# Patient Record
Sex: MALE | Race: WHITE | HISPANIC OR LATINO | URBAN - METROPOLITAN AREA
[De-identification: names, ages, dates, MRNs, and addresses within clinical notes are randomized per-mention and may not be internally consistent; named-entity substitution may affect disease eponyms.]

---

## 2020-03-14 ENCOUNTER — HOSPITAL ENCOUNTER (EMERGENCY)
Facility: HOSPITAL | Age: 30
Discharge: HOME | End: 2020-03-14
Attending: EMERGENCY MEDICINE
Payer: MEDICAID

## 2020-03-14 VITALS
BODY MASS INDEX: 25.9 KG/M2 | DIASTOLIC BLOOD PRESSURE: 79 MMHG | RESPIRATION RATE: 18 BRPM | HEART RATE: 99 BPM | HEIGHT: 67 IN | WEIGHT: 165 LBS | SYSTOLIC BLOOD PRESSURE: 141 MMHG | TEMPERATURE: 98 F | OXYGEN SATURATION: 99 %

## 2020-03-14 DIAGNOSIS — S01.01XA SCALP LACERATION, INITIAL ENCOUNTER: ICD-10-CM

## 2020-03-14 DIAGNOSIS — T07.XXXA ABRASIONS OF MULTIPLE SITES: ICD-10-CM

## 2020-03-14 DIAGNOSIS — S09.90XA INJURY OF HEAD, INITIAL ENCOUNTER: Primary | ICD-10-CM

## 2020-03-14 PROCEDURE — 25000003 PHARM REV CODE 250: Performed by: EMERGENCY MEDICINE

## 2020-03-14 PROCEDURE — 99284 PR EMERGENCY DEPT VISIT,LEVEL IV: ICD-10-PCS | Mod: 25,,, | Performed by: EMERGENCY MEDICINE

## 2020-03-14 PROCEDURE — 99284 EMERGENCY DEPT VISIT MOD MDM: CPT | Mod: 25

## 2020-03-14 PROCEDURE — 12002 RPR S/N/AX/GEN/TRNK2.6-7.5CM: CPT

## 2020-03-14 PROCEDURE — 12002 PR RESUP NPTERF WND BODY 2.6-7.5 CM: ICD-10-PCS | Mod: ,,, | Performed by: EMERGENCY MEDICINE

## 2020-03-14 PROCEDURE — 99284 EMERGENCY DEPT VISIT MOD MDM: CPT | Mod: 25,,, | Performed by: EMERGENCY MEDICINE

## 2020-03-14 PROCEDURE — 12002 RPR S/N/AX/GEN/TRNK2.6-7.5CM: CPT | Mod: ,,, | Performed by: EMERGENCY MEDICINE

## 2020-03-14 RX ORDER — LIDOCAINE HYDROCHLORIDE AND EPINEPHRINE 10; 10 MG/ML; UG/ML
10 INJECTION, SOLUTION INFILTRATION; PERINEURAL
Status: DISCONTINUED | OUTPATIENT
Start: 2020-03-14 | End: 2020-03-14 | Stop reason: HOSPADM

## 2020-03-14 RX ORDER — ONDANSETRON 4 MG/1
4 TABLET, ORALLY DISINTEGRATING ORAL EVERY 8 HOURS PRN
Qty: 12 TABLET | Refills: 1 | Status: SHIPPED | OUTPATIENT
Start: 2020-03-14

## 2020-03-14 RX ORDER — ONDANSETRON 4 MG/1
4 TABLET, ORALLY DISINTEGRATING ORAL
Status: COMPLETED | OUTPATIENT
Start: 2020-03-14 | End: 2020-03-14

## 2020-03-14 RX ADMIN — ONDANSETRON 4 MG: 4 TABLET, ORALLY DISINTEGRATING ORAL at 08:03

## 2020-03-14 NOTE — ED TRIAGE NOTES
To the ED following a skate board fall.  Pt believes he fell onto his right forearm and than struck his head.  No LOC, reporting a headache  Pain 5/10.

## 2020-03-14 NOTE — ED NOTES
LOC: The patient is awake, alert, and oriented to place, time, situation. Affect is appropriate.  Speech is appropriate and clear.     APPEARANCE: Patient resting uncomfortably, reporting headache.  Patient is clean and well groomed.    SKIN: The skin is warm and dry; color consistent with ethnicity.  Patient has normal skin turgor and moist mucus membranes.  LAceration to back of head, abrasion to left cheek, under chin, left eyebrow. Right  forearm      MUSCULOSKELETAL: Patient moving upper and lower extremities without difficulty.  Denies weakness.     RESPIRATORY: Airway is open and patent. Respirations spontaneous, even, easy, and non-labored.  Patient has a normal effort and rate.  No accessory muscle use noted. Denies cough.     CARDIAC:   No peripheral edema noted. No complaints of chest pain.      ABDOMEN: Soft and non tender to palpation.  No distention noted.     NEUROLOGIC: Eyes open spontaneously.  Reporting headache.  Behavior appropriate to situation.  Follows commands; facial expression symmetrical.  Purposeful motor response noted; normal sensation in all extremities.

## 2020-03-15 NOTE — ED NOTES
Called CT scan. CT scan technician states there is a patient on the table at this time but they will come get patient right after.

## 2020-03-15 NOTE — ED PROVIDER NOTES
Encounter Date: 3/14/2020    SCRIBE #1 NOTE: I, Francie Armstrong, am scribing for, and in the presence of,  Dr. Pino. I have scribed the entire note.       History     Chief Complaint   Patient presents with    Fall     was on skate board,hit head bleeding controlled, no loc , numerous abrasions     Time patient was seen by the provider: 7:14 PM      The patient is a 29 y.o. male with no known co-morbidities, who presents to the ED with a complaint of occipital lacerations, with associated facial and right arm abrasions, after falling off of a skateboard 45 minutes ago. His memory of the fall is not clear. No LOC. He was not wearing a helmet. He walked around normally afterwards. No vomiting. Last tetanus was last year.     The history is provided by the patient.     Review of patient's allergies indicates:  No Known Allergies  History reviewed. No pertinent past medical history.  History reviewed. No pertinent surgical history.  No family history on file.  Social History     Tobacco Use    Smoking status: Never Smoker    Smokeless tobacco: Never Used   Substance Use Topics    Alcohol use: Yes    Drug use: Never     Review of Systems   HENT:        Positive for occipital lacerations.    Gastrointestinal: Negative for nausea and vomiting.   Skin:        Positive for lacerations over occiput. Positive for abrasions on right arm and left side of face.    Neurological:        Negative for loss of consciousness.    All other systems reviewed and are negative.      Physical Exam     Initial Vitals [03/14/20 1845]   BP Pulse Resp Temp SpO2   (!) 142/78 99 18 98.1 °F (36.7 °C) 99 %      MAP       --         Physical Exam    Nursing note and vitals reviewed.  Constitutional: He appears well-developed and well-nourished. He is not diaphoretic. No distress.   HENT:   Head: Normocephalic.   Right occiput 1 cm laceration and 3 cm laceration. Abrasion to left eyebrow, left cheek, left nose.    Eyes: Pupils are equal, round,  and reactive to light.   Neck: Normal range of motion. Neck supple.   No bony tenderness or deformities.    Cardiovascular: Normal rate, regular rhythm and normal heart sounds. Exam reveals no gallop and no friction rub.    No murmur heard.  Pulmonary/Chest: Breath sounds normal. No respiratory distress. He has no wheezes. He has no rhonchi. He has no rales.   Abdominal: Soft. He exhibits no distension. There is no tenderness. There is no rebound and no guarding.   Musculoskeletal: Normal range of motion.   No bony tenderness or deformities.    Neurological: He is alert and oriented to person, place, and time. He has normal strength. No cranial nerve deficit or sensory deficit. GCS score is 15. GCS eye subscore is 4. GCS verbal subscore is 5. GCS motor subscore is 6.   Skin: Skin is warm and dry.   15-20 cm x 3 cm abrasion to the right proximal lateral forearm. Few abrasions on bilateral hands.          ED Course   Lac Repair  Date/Time: 3/14/2020 9:22 PM  Performed by: Francie Armstrong  Authorized by: Arthur Pino MD   Body area: head/neck  Location details: scalp  Laceration length: 3 cm    Anesthesia:  Local Anesthetic: lidocaine 1% with epinephrine  Skin closure: staples  Number of sutures: 3    Lac Repair  Date/Time: 3/14/2020 9:23 PM  Performed by: Francie Armstrong  Authorized by: Arthur Pino MD   Laceration length: 1 cm    Anesthesia:  Local Anesthetic: lidocaine 1% with epinephrine  Skin closure: staples  Number of sutures: 1        Labs Reviewed - No data to display       Imaging Results          CT Head Without Contrast (Final result)  Result time 03/14/20 21:16:40    Final result by Amadeo Nascimento MD (03/14/20 21:16:40)                 Impression:      Right posterior scalp hematoma/soft tissue swelling, as above.  No underlying displaced calvarial fracture.  No intracranial hemorrhage.  (Of note, evaluation of vascular structures is limited by technique and lack of intravenous  contrast.)    Electronically signed by resident: Korey Saavedra MD  Date:    03/14/2020  Time:    20:47    Electronically signed by: Amadeo Nascimento MD  Date:    03/14/2020  Time:    21:16             Narrative:    EXAMINATION:  CT HEAD WITHOUT CONTRAST    CLINICAL HISTORY:  Head trauma, intracranial arterial inj suspected;    TECHNIQUE:  Low dose axial images were obtained through the head.  Coronal and sagittal reformations were also performed. Contrast was not administered.    COMPARISON:  No priors.    FINDINGS:  The ventricles are normal in size for age and midline.  No evidence of hydrocephalus.  The brain parenchyma is normal in attenuation.  Focal region of hypoattenuation along the anterior right sylvian fissure, presumably a prominent subarachnoid space.  No evidence of acute or remote major vascular distribution infarct, intracranial hemorrhage, edema, or mass effect.  No extra-axial blood or fluid collections.    There is mixed attenuation right parietal scalp hematoma with punctate internal air and surrounding soft tissue swelling extending inferiorly along the right lateral aspect of the posterior neck.  No convincing evidence of underlying calvarial fracture.  There is patchy mucosal thickening of the ethmoid and maxillary sinuses.  The mastoid air cells are essentially clear.                              X-Rays:   Independently Interpreted Readings:   Head CT: No intracranial bleed.      Medical Decision Making:   History:   Old Medical Records: I decided to obtain old medical records.  Initial Assessment:   Patient has a head injury, facial injury, and abrasions after a skateboard injury. He has a normal neuro exam and GCS 15. Nexus C spine rule negative and he does not require imaging. [place] head CT could be positive for mechanism only. I discussed this with the patient and he declined imaging including head and facial CT. I have a low suspicion of intracranial bleed, skull fracture, or facial  fracture.   Independently Interpreted Test(s):   I have ordered and independently interpreted X-rays - see prior notes.  Clinical Tests:   Radiological Study: Ordered and Reviewed            Scribe Attestation:   Scribe #1: I performed the above scribed service and the documentation accurately describes the services I performed. I attest to the accuracy of the note.            ED Course as of Mar 14 2336   Sat Mar 14, 2020   2006 The patient previously declined head CT.  He is now vomiting.  He was given Zofran ODT.  I explained that vomiting could be a sign of head injury and he does require a head CT.  The patient continues declined this.  I began to discuss the AMA form and risks of declining the head CT.  The patient then asked to contact his mother to further discuss details.  His concerned about the cost of the head CT.  I explained that in the setting of head trauma with persistent nausea vomiting, head CT is indicated.     [LAURA]      ED Course User Index  [LAURA] Arthur Pino MD                Clinical Impression:       ICD-10-CM ICD-9-CM   1. Injury of head, initial encounter S09.90XA 959.01   2. Scalp laceration, initial encounter S01.01XA 873.0   3. Abrasions of multiple sites T07.XXXA 919.0   4. Concussion with loss of consciousness of 30 minutes or less, initial encounter S06.0X1A 850.11         Disposition:   Disposition: Discharged  Condition: Stable     ED Disposition Condition    Discharge Stable        ED Prescriptions     Medication Sig Dispense Start Date End Date Auth. Provider    ondansetron (ZOFRAN-ODT) 4 MG TbDL Take 1 tablet (4 mg total) by mouth every 8 (eight) hours as needed. 12 tablet 3/14/2020  Arthur Pino MD        Follow-up Information     Follow up With Specialties Details Why Contact Info Additional Information    [facility]  As needed [address] [address] [phone]     [facility] - Neurology Neurology  In 1 week  [address]  [place] [address] [phone] 7th Floor - [facility] [place]                      I, Dr. Bud Pino, personally performed the services described in this documentation. All medical record entries made by the scribe were at my direction and in my presence.  I have reviewed the chart and agree that the record reflects my personal performance and is accurate and complete. Bud Pino MD.               Arthur Pino MD  03/14/20 5320

## 2020-09-04 ENCOUNTER — OFFICE VISIT (OUTPATIENT)
Dept: URGENT CARE | Facility: CLINIC | Age: 30
End: 2020-09-04
Payer: MEDICAID

## 2020-09-04 VITALS
HEART RATE: 85 BPM | BODY MASS INDEX: 25.9 KG/M2 | WEIGHT: 165 LBS | DIASTOLIC BLOOD PRESSURE: 84 MMHG | HEIGHT: 67 IN | TEMPERATURE: 99 F | RESPIRATION RATE: 16 BRPM | OXYGEN SATURATION: 97 % | SYSTOLIC BLOOD PRESSURE: 148 MMHG

## 2020-09-04 DIAGNOSIS — Z20.822 CLOSE EXPOSURE TO COVID-19 VIRUS: Primary | ICD-10-CM

## 2020-09-04 DIAGNOSIS — R50.9 FEVER, UNSPECIFIED FEVER CAUSE: ICD-10-CM

## 2020-09-04 LAB
CTP QC/QA: YES
SARS-COV-2 RDRP RESP QL NAA+PROBE: NEGATIVE

## 2020-09-04 PROCEDURE — U0002 COVID-19 LAB TEST NON-CDC: HCPCS | Mod: QW,S$GLB,, | Performed by: NURSE PRACTITIONER

## 2020-09-04 PROCEDURE — U0002: ICD-10-PCS | Mod: QW,S$GLB,, | Performed by: NURSE PRACTITIONER

## 2020-09-04 PROCEDURE — 99203 OFFICE O/P NEW LOW 30 MIN: CPT | Mod: S$GLB,,, | Performed by: NURSE PRACTITIONER

## 2020-09-04 PROCEDURE — 99203 PR OFFICE/OUTPT VISIT, NEW, LEVL III, 30-44 MIN: ICD-10-PCS | Mod: S$GLB,,, | Performed by: NURSE PRACTITIONER

## 2020-09-04 NOTE — PATIENT INSTRUCTIONS
If your condition worsens or fails to improve we recommend that you receive another evaluation at the ER immediately or contact your PCP to discuss your concerns or return here. You must understand that you've received an urgent care treatment only and that you may be released before all your medical problems are known or treated. You the patient will arrange for followup care as instructed.    If you were prescribed antibiotics, please take them to completion.  If you were prescribed a narcotic medication, do not drive or operate heavy equipment or machinery while taking these medications.  Please follow up with your primary care doctor or specialist as needed.  If you  smoke, please stop smoking.  Guidelines for General Prevention of COVID-19    o Take steps to protect yourself from COVID-19. Perform hand hygiene frequently. Wash your hands often with soap and water for at least 20 seconds of use and alcohol-based hand , covering all surfaces of your hands and rubbing them together until they feel dry.  o Avoid touching your eyes, nose, and mouth with unwashed hands.  o Avoid close contact with people and stay home if youre sick, except to get medical care.   o Cover coughs and sneezes with a tissue, or use the inside of your elbow. Immediately wash your hands or use hand .     For more information, see CDC link below:    https://www.cdc.gov/coronavirus/2019-ncov/hcp/guidance-prevent-spread.html#precautions

## 2020-09-04 NOTE — PROGRESS NOTES
"Subjective:       Patient ID: Patrick Olsen is a 29 y.o. male.    Vitals:  height is 5' 7" (1.702 m) and weight is 74.8 kg (165 lb). His temporal temperature is 99.1 °F (37.3 °C). His blood pressure is 148/84 (abnormal) and his pulse is 85. His respiration is 16 and oxygen saturation is 97%.     Chief Complaint: COVID-19 Concerns (FEVER, FATIGUE)    This is a 29 y.o. male who presents today with a chief complaint of fatigue and fever that started 3 days ago, patient reports his brother tested positive but he has been self quarantine, denies cough but reports mild shortness of breath due to anxiety, denies headache, denies nausea,  vomiting or diarrhea or abdominal pain, patient reports he does have loss of taste or smell but that's been going on since he had the concussion in March of 2020, denies history of asthma or wheezing, denies dizziness or lightheadedness    Other  This is a new (EXPOSURE TO POS COVID-19) problem. The current episode started in the past 7 days (X3 DAYS). The problem occurs constantly. The problem has been unchanged. Associated symptoms include fatigue and a fever. Pertinent negatives include no arthralgias, chest pain, chills, congestion, coughing, headaches, joint swelling, myalgias, nausea, rash, sore throat, vertigo or vomiting. Nothing aggravates the symptoms. He has tried nothing for the symptoms.       Constitution: Positive for fatigue and fever. Negative for chills.   HENT: Negative for congestion and sore throat.    Neck: Negative for painful lymph nodes.   Cardiovascular: Negative for chest pain and leg swelling.   Eyes: Negative for double vision and blurred vision.   Respiratory: Negative for cough and shortness of breath.    Gastrointestinal: Negative for nausea, vomiting and diarrhea.   Genitourinary: Negative for dysuria, frequency and urgency.   Musculoskeletal: Negative for joint pain, joint swelling, muscle cramps and muscle ache.   Skin: Negative for color change, pale and " rash.   Allergic/Immunologic: Negative for seasonal allergies.   Neurological: Negative for dizziness, history of vertigo, light-headedness, passing out and headaches.   Hematologic/Lymphatic: Negative for swollen lymph nodes, easy bruising/bleeding and history of blood clots. Does not bruise/bleed easily.   Psychiatric/Behavioral: Negative for nervous/anxious, sleep disturbance and depression. The patient is not nervous/anxious.        Objective:      Physical Exam   Constitutional: He is oriented to person, place, and time. He appears well-developed. He is cooperative.  Non-toxic appearance. He does not appear ill. No distress.   HENT:   Head: Normocephalic and atraumatic.   Ears:   Right Ear: Hearing, tympanic membrane, external ear and ear canal normal.   Left Ear: Hearing, tympanic membrane, external ear and ear canal normal.   Nose: Nose normal. No mucosal edema, rhinorrhea or nasal deformity. No epistaxis. Right sinus exhibits no maxillary sinus tenderness and no frontal sinus tenderness. Left sinus exhibits no maxillary sinus tenderness and no frontal sinus tenderness.   Mouth/Throat: Uvula is midline, oropharynx is clear and moist and mucous membranes are normal. No trismus in the jaw. Normal dentition. No uvula swelling. No oropharyngeal exudate, posterior oropharyngeal edema or posterior oropharyngeal erythema.   Eyes: Conjunctivae and lids are normal. No scleral icterus.   Neck: Trachea normal, full passive range of motion without pain and phonation normal. Neck supple. No neck rigidity. No edema and no erythema present.   Cardiovascular: Normal rate, regular rhythm, normal heart sounds and normal pulses.   Pulmonary/Chest: Effort normal and breath sounds normal. No stridor. No respiratory distress. He has no decreased breath sounds. He has no wheezes. He has no rhonchi. He has no rales.   Abdominal: Normal appearance.   Musculoskeletal: Normal range of motion.         General: No deformity.    Neurological: He is alert and oriented to person, place, and time. He exhibits normal muscle tone. Coordination normal.   Skin: Skin is warm, dry, intact, not diaphoretic and not pale. Psychiatric: His speech is normal and behavior is normal. Judgment and thought content normal.   Nursing note and vitals reviewed.        Results for orders placed or performed in visit on 09/04/20   POCT COVID-19 Rapid Screening   Result Value Ref Range    POC Rapid COVID Negative Negative     Acceptable Yes          Detailed education provided about COVID-19 precautions and recommendations as per CDC guidelines.  Discussed results/diagnosis/plan in depth with patient in clinic. Strict precautions given to patient to monitor for worsening signs and symptoms. Advised to follow up with primary.All questions answered. Strict ER precautions given. If your symptoms worsens of fail to improve you should go to the Emergency Room. Discharge and follow-up instructions given verbally/printed. Patient voiced understanding and in agreement with current treatment plan.        Assessment:       1. Close Exposure to Covid-19 Virus    2. Fever, unspecified fever cause        Plan:         Close Exposure to Covid-19 Virus    Fever, unspecified fever cause  -     POCT COVID-19 Rapid Screening      Patient Instructions   If your condition worsens or fails to improve we recommend that you receive another evaluation at the ER immediately or contact your PCP to discuss your concerns or return here. You must understand that you've received an urgent care treatment only and that you may be released before all your medical problems are known or treated. You the patient will arrange for followup care as instructed.    If you were prescribed antibiotics, please take them to completion.  If you were prescribed a narcotic medication, do not drive or operate heavy equipment or machinery while taking these medications.  Please follow up with your  primary care doctor or specialist as needed.  If you  smoke, please stop smoking.  Guidelines for General Prevention of COVID-19    o Take steps to protect yourself from COVID-19. Perform hand hygiene frequently. Wash your hands often with soap and water for at least 20 seconds of use and alcohol-based hand , covering all surfaces of your hands and rubbing them together until they feel dry.  o Avoid touching your eyes, nose, and mouth with unwashed hands.  o Avoid close contact with people and stay home if youre sick, except to get medical care.   o Cover coughs and sneezes with a tissue, or use the inside of your elbow. Immediately wash your hands or use hand .     For more information, see CDC link below:    https://www.cdc.gov/coronavirus/2019-ncov/hcp/guidance-prevent-spread.html#precautions

## 2021-07-01 ENCOUNTER — PATIENT MESSAGE (OUTPATIENT)
Dept: ADMINISTRATIVE | Facility: OTHER | Age: 31
End: 2021-07-01

## 2021-11-30 ENCOUNTER — OFFICE VISIT (OUTPATIENT)
Dept: URGENT CARE | Facility: CLINIC | Age: 31
End: 2021-11-30
Payer: MEDICAID

## 2021-11-30 VITALS
RESPIRATION RATE: 16 BRPM | TEMPERATURE: 99 F | HEIGHT: 67 IN | WEIGHT: 165 LBS | BODY MASS INDEX: 25.9 KG/M2 | OXYGEN SATURATION: 99 % | DIASTOLIC BLOOD PRESSURE: 97 MMHG | SYSTOLIC BLOOD PRESSURE: 148 MMHG | HEART RATE: 81 BPM

## 2021-11-30 DIAGNOSIS — J06.9 VIRAL URI WITH COUGH: Primary | ICD-10-CM

## 2021-11-30 DIAGNOSIS — Z87.898 HISTORY OF HEMOPTYSIS: ICD-10-CM

## 2021-11-30 DIAGNOSIS — Z87.448 HISTORY OF BLOOD IN URINE: ICD-10-CM

## 2021-11-30 DIAGNOSIS — R05.9 COUGH: ICD-10-CM

## 2021-11-30 LAB
BILIRUB UR QL STRIP: NEGATIVE
CTP QC/QA: YES
CTP QC/QA: YES
GLUCOSE UR QL STRIP: NEGATIVE
KETONES UR QL STRIP: NEGATIVE
LEUKOCYTE ESTERASE UR QL STRIP: NEGATIVE
PH, POC UA: 6 (ref 5–8)
POC BLOOD, URINE: POSITIVE
POC MOLECULAR INFLUENZA A AGN: NEGATIVE
POC MOLECULAR INFLUENZA B AGN: NEGATIVE
POC NITRATES, URINE: NEGATIVE
PROT UR QL STRIP: NEGATIVE
SARS-COV-2 RDRP RESP QL NAA+PROBE: NEGATIVE
SP GR UR STRIP: 1.01 (ref 1–1.03)
UROBILINOGEN UR STRIP-ACNC: ABNORMAL (ref 0.3–2.2)

## 2021-11-30 PROCEDURE — 81003 URINALYSIS AUTO W/O SCOPE: CPT | Mod: QW,S$GLB,,

## 2021-11-30 PROCEDURE — 87502 POCT INFLUENZA A/B MOLECULAR: ICD-10-PCS | Mod: QW,S$GLB,,

## 2021-11-30 PROCEDURE — 87502 INFLUENZA DNA AMP PROBE: CPT | Mod: QW,S$GLB,,

## 2021-11-30 PROCEDURE — 87086 URINE CULTURE/COLONY COUNT: CPT

## 2021-11-30 PROCEDURE — U0002 COVID-19 LAB TEST NON-CDC: HCPCS | Mod: QW,S$GLB,,

## 2021-11-30 PROCEDURE — 99213 PR OFFICE/OUTPT VISIT, EST, LEVL III, 20-29 MIN: ICD-10-PCS | Mod: 25,S$GLB,,

## 2021-11-30 PROCEDURE — 99213 OFFICE O/P EST LOW 20 MIN: CPT | Mod: 25,S$GLB,,

## 2021-11-30 PROCEDURE — U0002: ICD-10-PCS | Mod: QW,S$GLB,,

## 2021-11-30 PROCEDURE — 81003 POCT URINALYSIS, DIPSTICK, AUTOMATED, W/O SCOPE: ICD-10-PCS | Mod: QW,S$GLB,,

## 2021-12-01 ENCOUNTER — TELEPHONE (OUTPATIENT)
Dept: URGENT CARE | Facility: CLINIC | Age: 31
End: 2021-12-01
Payer: MEDICAID

## 2021-12-02 LAB — BACTERIA UR CULT: NO GROWTH

## 2021-12-06 ENCOUNTER — TELEPHONE (OUTPATIENT)
Dept: URGENT CARE | Facility: CLINIC | Age: 31
End: 2021-12-06
Payer: MEDICAID

## 2022-02-15 DIAGNOSIS — R94.5 ABNORMAL RESULTS OF LIVER FUNCTION STUDIES: Primary | ICD-10-CM

## 2022-06-08 ENCOUNTER — HOSPITAL ENCOUNTER (OUTPATIENT)
Dept: RADIOLOGY | Facility: HOSPITAL | Age: 32
Discharge: HOME | End: 2022-06-08
Attending: INTERNAL MEDICINE
Payer: MEDICAID

## 2022-06-08 DIAGNOSIS — R94.5 ABNORMAL RESULTS OF LIVER FUNCTION STUDIES: ICD-10-CM

## 2022-06-08 PROCEDURE — 76700 US ABDOMEN COMPLETE: ICD-10-PCS | Mod: 26,,, | Performed by: RADIOLOGY

## 2022-06-08 PROCEDURE — 76700 US EXAM ABDOM COMPLETE: CPT | Mod: 26,,, | Performed by: RADIOLOGY

## 2022-06-08 PROCEDURE — 76700 US EXAM ABDOM COMPLETE: CPT | Mod: TC

## 2025-08-29 ENCOUNTER — DOCUMENTATION ONLY (OUTPATIENT)
Dept: HEMATOLOGY/ONCOLOGY | Facility: CLINIC | Age: 35
End: 2025-08-29
Payer: MEDICAID

## 2025-08-30 PROBLEM — K68.9: Status: ACTIVE | Noted: 2025-08-30

## 2025-08-30 PROBLEM — N13.30 HYDRONEPHROSIS: Status: ACTIVE | Noted: 2025-08-30

## 2025-08-30 PROBLEM — C62.92: Status: ACTIVE | Noted: 2025-08-30

## 2025-08-30 PROBLEM — N13.5 URETERAL OBSTRUCTION: Status: ACTIVE | Noted: 2025-08-30

## 2025-08-30 PROBLEM — G89.29 CHRONIC PAIN: Status: ACTIVE | Noted: 2025-08-30

## 2025-08-30 PROBLEM — Z51.5 ENCOUNTER FOR PALLIATIVE CARE: Status: ACTIVE | Noted: 2025-08-30

## 2025-08-30 PROBLEM — I82.890 SUPERFICIAL VEIN THROMBOSIS: Status: ACTIVE | Noted: 2025-08-30

## 2025-08-30 PROBLEM — D64.9 SYMPTOMATIC ANEMIA: Status: ACTIVE | Noted: 2025-08-30

## 2025-08-30 PROBLEM — G89.3 CANCER ASSOCIATED PAIN: Status: ACTIVE | Noted: 2025-08-30

## 2025-08-31 PROBLEM — C62.90 TESTICULAR CANCER: Status: ACTIVE | Noted: 2025-08-31

## 2025-08-31 PROBLEM — E87.1 HYPONATREMIA: Status: ACTIVE | Noted: 2025-08-31

## 2025-08-31 PROBLEM — R11.0 NAUSEA: Status: ACTIVE | Noted: 2025-08-31

## 2025-09-01 PROBLEM — E46 MALNUTRITION: Status: ACTIVE | Noted: 2025-09-01

## 2025-09-01 PROBLEM — K31.5 DUODENAL OBSTRUCTION, ACQUIRED: Status: ACTIVE | Noted: 2025-09-01

## 2025-09-02 ENCOUNTER — TUMOR BOARD CONFERENCE (OUTPATIENT)
Dept: UROLOGY | Facility: HOSPITAL | Age: 35
End: 2025-09-02
Payer: MEDICAID

## 2025-09-02 PROBLEM — Z71.89 GOALS OF CARE, COUNSELING/DISCUSSION: Status: ACTIVE | Noted: 2025-09-02

## 2025-09-02 PROBLEM — Z51.5 PALLIATIVE CARE ENCOUNTER: Status: ACTIVE | Noted: 2025-09-02

## 2025-09-03 PROBLEM — F41.9 ANXIETY: Status: ACTIVE | Noted: 2025-09-03

## 2025-09-04 PROBLEM — E44.0 MODERATE MALNUTRITION: Status: ACTIVE | Noted: 2025-09-04
